# Patient Record
Sex: MALE | Race: WHITE | Employment: FULL TIME | URBAN - METROPOLITAN AREA
[De-identification: names, ages, dates, MRNs, and addresses within clinical notes are randomized per-mention and may not be internally consistent; named-entity substitution may affect disease eponyms.]

---

## 2022-10-27 ENCOUNTER — LABORATORY ENCOUNTER (OUTPATIENT)
Dept: LAB | Age: 67
End: 2022-10-27
Attending: ORTHOPAEDIC SURGERY
Payer: COMMERCIAL

## 2022-10-27 DIAGNOSIS — Z01.818 PRE-OP TESTING: ICD-10-CM

## 2022-10-27 PROCEDURE — 86850 RBC ANTIBODY SCREEN: CPT

## 2022-10-27 PROCEDURE — 86901 BLOOD TYPING SEROLOGIC RH(D): CPT

## 2022-10-27 PROCEDURE — 87081 CULTURE SCREEN ONLY: CPT

## 2022-10-27 PROCEDURE — 86900 BLOOD TYPING SEROLOGIC ABO: CPT

## 2022-10-27 PROCEDURE — 36415 COLL VENOUS BLD VENIPUNCTURE: CPT

## 2022-10-28 LAB
ANTIBODY SCREEN: NEGATIVE
RH BLOOD TYPE: POSITIVE

## 2022-11-30 ENCOUNTER — LABORATORY ENCOUNTER (OUTPATIENT)
Dept: LAB | Age: 67
End: 2022-11-30
Attending: ORTHOPAEDIC SURGERY
Payer: COMMERCIAL

## 2022-11-30 DIAGNOSIS — Z01.818 PRE-OP TESTING: ICD-10-CM

## 2022-11-30 LAB
APTT PPP: 37.4 SECONDS (ref 23.3–35.6)
INR BLD: 0.98 (ref 0.85–1.16)
PROTHROMBIN TIME: 12.9 SECONDS (ref 11.6–14.8)

## 2022-11-30 PROCEDURE — 85730 THROMBOPLASTIN TIME PARTIAL: CPT

## 2022-11-30 PROCEDURE — 85610 PROTHROMBIN TIME: CPT

## 2022-11-30 PROCEDURE — 36415 COLL VENOUS BLD VENIPUNCTURE: CPT

## 2022-11-30 PROCEDURE — 87081 CULTURE SCREEN ONLY: CPT

## 2024-08-29 RX ORDER — ACETAMINOPHEN 500 MG
1000 TABLET ORAL EVERY 6 HOURS
COMMUNITY
Start: 2022-11-04

## 2024-09-12 ENCOUNTER — EKG ENCOUNTER (OUTPATIENT)
Dept: LAB | Age: 69
End: 2024-09-12
Attending: ORTHOPAEDIC SURGERY
Payer: COMMERCIAL

## 2024-09-12 ENCOUNTER — LAB ENCOUNTER (OUTPATIENT)
Dept: LAB | Age: 69
End: 2024-09-12
Attending: ORTHOPAEDIC SURGERY
Payer: COMMERCIAL

## 2024-09-12 DIAGNOSIS — Z01.818 PRE-OP TESTING: ICD-10-CM

## 2024-09-12 LAB
ALBUMIN SERPL-MCNC: 4.4 G/DL (ref 3.2–4.8)
ALBUMIN/GLOB SERPL: 1.5 {RATIO} (ref 1–2)
ALP LIVER SERPL-CCNC: 85 U/L
ALT SERPL-CCNC: 18 U/L
ANION GAP SERPL CALC-SCNC: 7 MMOL/L (ref 0–18)
ANTIBODY SCREEN: NEGATIVE
AST SERPL-CCNC: 21 U/L (ref ?–34)
BASOPHILS # BLD AUTO: 0.07 X10(3) UL (ref 0–0.2)
BASOPHILS NFR BLD AUTO: 0.7 %
BILIRUB SERPL-MCNC: 0.5 MG/DL (ref 0.2–1.1)
BUN BLD-MCNC: 15 MG/DL (ref 9–23)
BUN/CREAT SERPL: 21.7 (ref 10–20)
CALCIUM BLD-MCNC: 9.5 MG/DL (ref 8.7–10.4)
CHLORIDE SERPL-SCNC: 101 MMOL/L (ref 98–112)
CO2 SERPL-SCNC: 25 MMOL/L (ref 21–32)
CREAT BLD-MCNC: 0.69 MG/DL
DEPRECATED RDW RBC AUTO: 44.4 FL (ref 35.1–46.3)
EGFRCR SERPLBLD CKD-EPI 2021: 99 ML/MIN/1.73M2 (ref 60–?)
EOSINOPHIL # BLD AUTO: 0.18 X10(3) UL (ref 0–0.7)
EOSINOPHIL NFR BLD AUTO: 1.9 %
ERYTHROCYTE [DISTWIDTH] IN BLOOD BY AUTOMATED COUNT: 11.9 % (ref 11–15)
FASTING STATUS PATIENT QL REPORTED: YES
GLOBULIN PLAS-MCNC: 3 G/DL (ref 2–3.5)
GLUCOSE BLD-MCNC: 97 MG/DL (ref 70–99)
HCT VFR BLD AUTO: 36.7 %
HGB BLD-MCNC: 13.1 G/DL
IMM GRANULOCYTES # BLD AUTO: 0.03 X10(3) UL (ref 0–1)
IMM GRANULOCYTES NFR BLD: 0.3 %
LYMPHOCYTES # BLD AUTO: 2.3 X10(3) UL (ref 1–4)
LYMPHOCYTES NFR BLD AUTO: 24.3 %
MCH RBC QN AUTO: 35.9 PG (ref 26–34)
MCHC RBC AUTO-ENTMCNC: 35.7 G/DL (ref 31–37)
MCV RBC AUTO: 100.5 FL
MONOCYTES # BLD AUTO: 0.59 X10(3) UL (ref 0.1–1)
MONOCYTES NFR BLD AUTO: 6.2 %
NEUTROPHILS # BLD AUTO: 6.3 X10 (3) UL (ref 1.5–7.7)
NEUTROPHILS # BLD AUTO: 6.3 X10(3) UL (ref 1.5–7.7)
NEUTROPHILS NFR BLD AUTO: 66.6 %
OSMOLALITY SERPL CALC.SUM OF ELEC: 277 MOSM/KG (ref 275–295)
PLATELET # BLD AUTO: 256 10(3)UL (ref 150–450)
POTASSIUM SERPL-SCNC: 4.4 MMOL/L (ref 3.5–5.1)
PROT SERPL-MCNC: 7.4 G/DL (ref 5.7–8.2)
RBC # BLD AUTO: 3.65 X10(6)UL
RH BLOOD TYPE: POSITIVE
SODIUM SERPL-SCNC: 133 MMOL/L (ref 136–145)
WBC # BLD AUTO: 9.5 X10(3) UL (ref 4–11)

## 2024-09-12 PROCEDURE — 87081 CULTURE SCREEN ONLY: CPT

## 2024-09-12 PROCEDURE — 86900 BLOOD TYPING SEROLOGIC ABO: CPT

## 2024-09-12 PROCEDURE — 86850 RBC ANTIBODY SCREEN: CPT

## 2024-09-12 PROCEDURE — 93005 ELECTROCARDIOGRAM TRACING: CPT

## 2024-09-12 PROCEDURE — 86901 BLOOD TYPING SEROLOGIC RH(D): CPT

## 2024-09-12 PROCEDURE — 36415 COLL VENOUS BLD VENIPUNCTURE: CPT

## 2024-09-12 PROCEDURE — 80053 COMPREHEN METABOLIC PANEL: CPT

## 2024-09-12 PROCEDURE — 93010 ELECTROCARDIOGRAM REPORT: CPT | Performed by: INTERNAL MEDICINE

## 2024-09-12 PROCEDURE — 85025 COMPLETE CBC W/AUTO DIFF WBC: CPT

## 2024-09-13 LAB
ATRIAL RATE: 79 BPM
P AXIS: 68 DEGREES
P-R INTERVAL: 202 MS
Q-T INTERVAL: 340 MS
QRS DURATION: 78 MS
QTC CALCULATION (BEZET): 389 MS
R AXIS: -25 DEGREES
T AXIS: 20 DEGREES
VENTRICULAR RATE: 79 BPM

## 2024-09-21 NOTE — H&P
Aneesh Tian  9/20/2024 1:30 PM   Office Visit  MRN: [id number]  Description: 69 year old male Provider: Chaz Ng APRN Department: [facility] INTERNAL MEDICINE     Scanning Cover Sheet    Click to print Barcode Encounter Cover Sheet for scanning  Office Visit  9/20/2024  Internal Medicine - [address], [place], [place], APRN  Nurse Practitioner Preoperative clearance +2 more  Dx Pre-Op Exam  Reason for Visit     Reason for Visit    Pre-Op Exam      Progress Notes  Chaz Ng APRN (Nurse Practitioner)  Nurse Practitioner  Expand All Collapse All     Subjective:  Patient ID: Aneesh Tian is a 69 year old male presenting for preoperative evaluation prior to preoperative clearance prior to left hip replacement surgery with Dr. Ellison at [facility] on 9/22/2024.     HPI: Patient has a longstanding history of hip osteoarthritis.  He is currently ambulating on crutches full-time.  Is able to bear weight with altered gait.  Given patient's reduced functional status, determined to be a good surgical candidate for left hip replacement.     Currently on no prescribed medications and only takes Tylenol as needed for pain.  Has no other reported health history     Overall, denies pain, headache, dizziness, lightheadedness, fevers, hearing/vision changes, chest pain, dyspnea, palpitations, N/V/D, weakness, parasthesias.       Patient states he is in good overall health and is ready for upcoming procedure.      Procedure will be under general sedation.  Has no previously documented surgical history. No history of bleeding complications.  No allergies to medications     Able to carry out ADLs without exertional chest pain.       Denies lightheadedness, dizziness, palpitations, chest pain and dyspnea with exertion and at rest.     Per medical clearance request, EKG, labwork, and MRSA swab indicated.              History/Other:  HISTORY:       Past Medical History:   Diagnosis Date    Osteoarthritis, hip,  bilateral        No past surgical history on file.         Family History   Problem Relation Age of Onset    Diabetes Father        Social History    Tobacco Use      Smoking status: Former        Types: Cigarettes      Smokeless tobacco: Never      Tobacco comments: Last 2000    Vaping Use      Vaping status: Never Used    Alcohol use: Not Currently    Drug use: Never            Review of Systems   Constitutional: Negative.    HENT: Negative.     Eyes: Negative.    Respiratory: Negative.     Cardiovascular: Negative.    Gastrointestinal: Negative.    Endocrine: Negative.    Genitourinary: Negative.    Musculoskeletal:  Positive for arthralgias and gait problem.   Skin: Negative.    Allergic/Immunologic: Negative.    Neurological:  Negative for dizziness, tremors, seizures, syncope, facial asymmetry, speech difficulty, weakness, light-headedness, numbness and headaches.   Hematological: Negative.    Psychiatric/Behavioral: Negative.               Current Outpatient Medications   Medication Sig Dispense Refill    acetaminophen 500 MG Oral Tab Take 2 tablets (1,000 mg total) by mouth 4 (four) times daily. 120 tablet 0    acetaminophen 500 MG Oral Tab Take 2 tablets (1,000 mg total) by mouth every 6 (six) hours. 120 tablet 0    aspirin 81 MG Oral Tab EC Take 1 tablet (81 mg total) by mouth 2 (two) times a day. (Patient not taking: Reported on 9/20/2024) 84 tablet 0    celecoxib (CELEBREX) 200 MG Oral Cap Take 1 capsule (200 mg total) by mouth daily. (Patient not taking: Reported on 9/20/2024) 30 capsule 0    docusate sodium (COLACE) 100 MG Oral Cap Take 1 capsule (100 mg total) by mouth 2 (two) times daily. (Patient not taking: Reported on 9/20/2024) 60 capsule 1    oxyCODONE 5 MG Oral Tab Take 1 tablet (5 mg total) by mouth every 6 (six) hours as needed for Pain (severe pain). (Patient not taking: Reported on 9/20/2024) 20 tablet 0    traMADol 50 MG Oral Tab Take 1 tablet (50 mg total) by mouth every 6 (six) hours as  needed for Pain (moderate pain). (Patient not taking: Reported on 9/20/2024) 40 tablet 0      Allergies:  Allergies   No Known Allergies              Objective:[]Expand by Default    09/20/24  1335 09/20/24  1410   BP: 122/66     Pulse: 100 84   Temp: 98.6 °F (37 °C)     TempSrc: Oral     SpO2: 98%     Weight: 161 lb (73 kg)     Height: 5' 10\" (1.778 m)              Body mass index is 23.1 kg/m².     Physical Exam  Vitals reviewed.   Constitutional:       General: He is not in acute distress.     Appearance: Normal appearance. He is normal weight. He is not ill-appearing or toxic-appearing.   HENT:      Head: Normocephalic and atraumatic.      Right Ear: Tympanic membrane, ear canal and external ear normal.      Left Ear: Tympanic membrane, ear canal and external ear normal.      Nose: Congestion present. No rhinorrhea.      Mouth/Throat:      Mouth: Mucous membranes are moist.      Pharynx: Oropharynx is clear. No oropharyngeal exudate or posterior oropharyngeal erythema.   Eyes:      Extraocular Movements: Extraocular movements intact.      Conjunctiva/sclera: Conjunctivae normal.      Pupils: Pupils are equal, round, and reactive to light.   Cardiovascular:      Rate and Rhythm: Normal rate and regular rhythm.      Pulses: Normal pulses.      Heart sounds: Murmur heard.   Pulmonary:      Effort: Pulmonary effort is normal. No respiratory distress.      Breath sounds: Normal breath sounds. No wheezing or rhonchi.   Chest:      Chest wall: No tenderness.   Abdominal:      General: Bowel sounds are normal.      Palpations: Abdomen is soft.      Tenderness: There is no abdominal tenderness.   Musculoskeletal:         General: Tenderness present. Normal range of motion.      Cervical back: Normal range of motion and neck supple. No rigidity or tenderness.      Right lower leg: No edema.      Left lower leg: No edema.   Lymphadenopathy:      Cervical: No cervical adenopathy.   Skin:     General: Skin is warm and dry.       Capillary Refill: Capillary refill takes less than 2 seconds.      Findings: No erythema or rash.   Neurological:      General: No focal deficit present.      Mental Status: He is alert and oriented to person, place, and time. Mental status is at baseline.   Psychiatric:         Mood and Affect: Mood normal.         Behavior: Behavior normal.         Thought Content: Thought content normal.         Judgment: Judgment normal.                 [facility]  Outside Information      Contains abnormal data Comp Metabolic Panel (14)  Specimen: Blood         Component  Ref Range & Units 8 d ago   Glucose  70 - 99 mg/dL 97   Sodium  136 - 145 mmol/L 133 Low    Potassium  3.5 - 5.1 mmol/L 4.4   Chloride  98 - 112 mmol/L 101   CO2  21.0 - 32.0 mmol/L 25.0   Anion Gap  0 - 18 mmol/L 7   BUN  9 - 23 mg/dL 15   Creatinine  0.70 - 1.30 mg/dL 0.69 Low    BUN/CREA Ratio  10.0 - 20.0 21.7 High    Calcium, Total  8.7 - 10.4 mg/dL 9.5   Calculated Osmolality  275 - 295 mOsm/kg 277   eGFR-Cr  >=60 mL/min/1.73m2 99   ALT  10 - 49 U/L 18   AST  <34 U/L 21   Alkaline Phosphatase  45 - 117 U/L 85   Bilirubin, Total  0.2 - 1.1 mg/dL 0.5   Total Protein  5.7 - 8.2 g/dL 7.4   Albumin  3.2 - 4.8 g/dL 4.4   Globulin  2.0 - 3.5 g/dL 3.0   A/G Ratio  1.0 - 2.0 1.5   Patient Fasting for CMP? Yes   Resulting Agency [facility] ([facility])   Specimen Collected: 09/12/24 12:21 PM     Performed by: [facility] ([facility]) Last Resulted: 09/12/24  6:12 PM   Received From: [facility]  Result Received: 09/17/24 11:42 AM            [facility]  Outside Information      Contains abnormal data CBC W Differential W Platelet  Specimen: Blood         Component  Ref Range & Units 8 d ago   WBC  4.0 - 11.0 x10(3) uL 9.5   RBC  3.80 - 5.80 x10(6)uL 3.65 Low    HGB  13.0 - 17.5 g/dL 13.1   HCT  39.0 - 53.0 % 36.7 Low    MCV  80.0 - 100.0  fL 100.5 High    MCH  26.0 - 34.0 pg 35.9 High    MCHC  31.0 - 37.0 g/dL 35.7   RDW-SD  35.1 - 46.3 fL 44.4   RDW  11.0 - 15.0 % 11.9   PLT  150.0 - 450.0 10(3)uL 256.0   Neutrophil Absolute Prelim  1.50 - 7.70 x10 (3) uL 6.30   Neutrophil Absolute  1.50 - 7.70 x10(3) uL 6.30   Lymphocyte Absolute  1.00 - 4.00 x10(3) uL 2.30   Monocyte Absolute  0.10 - 1.00 x10(3) uL 0.59   Eosinophil Absolute  0.00 - 0.70 x10(3) uL 0.18   Basophil Absolute  0.00 - 0.20 x10(3) uL 0.07   Immature Granulocyte Absolute  0.00 - 1.00 x10(3) uL 0.03   Neutrophil %  % 66.6   Lymphocyte %  % 24.3   Monocyte %  % 6.2   Eosinophil %  % 1.9   Basophil %  % 0.7   Immature Granulocyte %  % 0.3   Resulting Agency [facility] ([facility])   Specimen Collected: 09/12/24 12:21 PM     Performed by: [facility] ([facility]) Last Resulted: 09/12/24  5:15 PM   Received From: [facility]  Result Received: 09/17/24 11:42 AM            [facility]  Outside Information     MSSA and MRSA Culture Screen  Specimen: Other - Specimen from internal nose (specimen)         Component 8 d ago   MSSA/MRSA Culture No MRSA or Staph aureus(MSSA) Isolated   Resulting Agency [facility] ([facility])   Specimen Collected: 09/12/24 12:21 PM     Performed by: [facility] ([facility]) Last Resulted: 09/13/24 11:47 AM   Received From: [facility]  Result Received: 09/17/24 11:42 AM       Recent Data from [facility]  Related to MSSA and MRSA Culture Screen  Component 09/12/24 11/30/22 10/27/22   MSSA/MRSA Culture No MRSA or Staph aureus(MSSA) Isolated No MRSA or Staph aureus(MSSA) Isolated No MRSA or Staph aureus(MSSA) Isolated                  Assessment & Plan:  Diagnoses and all orders for this visit:     Preoperative clearance  Hip arthritis  Bone disease  Physical exam stable.  Vital  signs stable.  No history of bleeding complications. No medication allergies.  CBC, CMP, EKG stable.  MRSA swab negative.     Medical clearance granted.  Acceptable surgical risk.  Patient medically optimized for surgery.  No further preoperative intervention required at this time.  I have discussed with patient and she wishes to proceed with planned procedure.       Patient may proceed with planned procedure: Left hip replacement surgery with Dr. Ellison at [facility] on 9/23/2024.     Note will be faxed and sent to [facility] preop.     Patient to follow-up with PCP post surgery.     Orders placed:     -     ELECTROCARDIOGRAM, COMPLETE           Meds This Visit:  Requested Prescriptions        No prescriptions requested or ordered in this encounter         Imaging & Referrals:  ELECTROCARDIOGRAM, COMPLETE     40 minutes total time spent in pre-visit work, reviewing history/test results, face to face time, education/counseling, placing orders, documentation, independent results interpretation, and coordination of care.                      Instructions    After Visit Summary (Printed 9/20/2024)  Additional Documentation    Vitals: /66 (BP Location: Left arm, Patient Position: Sitting, Cuff Size: adult)     Pulse 84     Temp 98.6 °F (37 °C) (Oral)     Ht 5' 10\" (1.778 m)     Wt 161 lb (73 kg)     SpO2 98%     BMI 23.10 kg/m²     BSA 1.9 m²          More Vitals   Flowsheets: DMG TEMP FOR BP BPA COMPARE   Encounter Info: Billing Info,     History,     Allergies,     Detailed Report     Communications    View All Conversations on this Encounter    Chart Routed to Mary Finley MD and Duy Ellison MD  Oncology History    Encounter Status    Electronically signed by Chaz Ng APRN on 9/20/24 at 5:54 PM  Encounter Reviewed By      ADDENDUM:    The above referenced H&P was reviewed by Duy Ellison MD on 9/27/2024, the patient was examined and no significant changes have occurred in the patient's  condition since the H&P was performed.  I discussed with the patient and/or legal representative the potential benefits, risks and side effects of this procedure; the likelihood of the patient achieving goals; and potential problems that might occur during recuperation.  I discussed reasonable alternatives to the procedure, including risks, benefits and side effects related to the alternatives and risks related to not receiving this procedure.  We will proceed with procedure as planned.

## 2024-09-22 NOTE — DISCHARGE INSTRUCTIONS
[facility]  Phone # [phone]  Fax # [phone]    Sometimes managing your health at home requires assistance.  The [facility] team has recognized your preference to use [facility]: [phone].  A representative from the home health agency will contact you or your family to schedule your first visit.        Please verify or make your first post operative appointment by calling your surgeon's office.  Appointment should be within next 10-21 days.     Call your surgeon's office if:  You have fever over 101.  Wound looks very red.  You have calf pain.    If you have chest pain or shortness of breath, call 911.    Move slowly and have your caregiver next to you for the first few days as you sit up from supine or stand from sitting.  You may feel lightheaded and need support.  Drink plenty of fluids to keep yourself hydrated.  You may put as much weight on operative leg as you feel comfortable.  You may wean off assistive walking device as you feel comfortable doing so under your therapist's supervision.    Wear your compressive sleeve during day time until follow up appointment.  Elevate and ice to control swelling.  Take 10 deep breaths every hour you are awake to keep your lungs expanded.  Perform active ankle pumps 10 times every hour you are awake.  You are encouraged to walk around your first floor living space every 1-2 hours while you are awake.  Start dressing change in one week from date of surgery.   Change dressing daily.    Please focus on range of motion of your knee.   Please achieve at least 0-90 degrees motion by your first postoperative appointment.    Medications:  Aspirin 81 mg:  take one tab twice daily to prevent blood clot.    Colace is a stool softener.  Please take twice daily.  Extra Strength Tylenol.  Take 2 tabs (1000mg) 4 times daily.  (maximum 4000mg daily)  Celebrex is a nonsteroidal anti-inflammatory medication. Please take one tab  daily.  Tramadol is a pain medication for moderate pain.  Use as needed per instruction.  Oxycodone is a pain medication for severe pain.  Use as needed per instruction  Do not take Tramadol and Oxydone at the same time.  Space out at least one hour.      Hip Replacement Discharge Instructions    Dear Patient,     [facility] cares about your progress with recovery following your joint replacement surgery.     300 days from your scheduled surgery, [facility] will send you a follow-up survey to help us understand how your surgery impacted your mobility, pain, and overall quality of life. Please make every effort to complete this survey. The information collected from this survey will be used by your physician to track your recovery.     Sincerely,     [facility]    Activity    Bathing  No tub baths, pools, or saunas until cleared by surgeon (about 4-6 weeks because it takes that long for the incision on the skin to heal and be a barrier to prevent infection.)  When allowed to shower:      AQUACEL dressing is waterproof and does not require being covered before showering.  Pat dressing and surrounding skin dry after shower                      AQUACEL        MEDIPORE/COVERLET dressing is NOT waterproof and REQUIRES being covered with a waterproof barrier to keep the dressing and incision dry.  SARAN WRAP, GLAD WRAP, PRESS N SEAL WORK REALLY WELL BUT ANY PLASTIC WRAP WILL DO.  Do not wash incision.   Remove entire wrapping and old dressing (if Medipore/coverlet) after showering. Pat dry with a CLEAN TOWEL if necessary and cover incision with new Medipore/coverlet. For other types of dressings, follow surgeon’s orders.                  MEDIPORE/COVERLET            Driving  Do not drive until cleared by surgeon. This is usually four to six weeks after surgery. Discuss this at follow-up office visit.   Not allowed while taking narcotic pain medication or muscle  relaxants.    Sex  Usually allowed after four to six weeks - check with surgeon at your office visit.  Return to work  Usually allowed after four to six weeks. Discuss specific work activities with your surgeon.    Restrictions  For hip replacement surgery, follow instructions provided by physical therapy    No smoking  Avoid smoking. It is known to cause breathing problems and can decrease the rate of healing.    Incision care/Dressing changes  Wash hands before and after dressing changes.    FOR MEDIPORE/COVERLET DRESSINGS:  Change dressing daily using Medipore/coverlet once Aquacel (waterproof) dressing is removed (which is about 7 days after surgery). Patient should be standing or lying flat so dressing goes on smoothly.  (This dressing needed for hip patients because of location of incision-don’t want contamination from bathroom use)   Continue this until your first visit with your surgeon’s office.  There could be a small amount of redness around the staples or incision; this is normal.  Watch for increased redness, warmth, any odor, increased drainage or opening of the incision. A little clear yellow or blood tinged drainage is normal up to 2 weeks after surgery but it should be less every day until it stops.  Call physician if you notice any concerning changes.  Sutures/staples will be removed at first office visit (10 days- 3 weeks).                         MEDIPORE /COVERLET          Medication  Anticoagulants = blood thinners (Xarelto, Eliquis, Lovenox, Coumadin or Aspirin)  Pill or shot form depending on what your physician orders.   IF placed on Coumadin, you may also need lab work done for monitoring.  You will bleed easier and bruise easier while on these medications.     Usually you will be on a blood thinner for about 4-5 weeks.  Contact your physician if you have signs of bruising, nose bleeds or blood in your urine. Use electric razors and soft toothbrushes only.  Do not take aspirin while taking  blood thinners unless ordered by your physician.  Review anticoagulant education information sheet provided.    Discomfort  Surgical discomfort is normal for one to two months.  Have realistic goals and keep a positive outlook.  Keep pain manageable; pain should not disrupt your sleep or activities like getting out of bed or walking.  You may need pain medication regularly (every 4-6 hours) the first 2 weeks and then begin to decrease how often you are taking it.  Take pain medication as prescribed with food, especially before therapy, allowing 30-60 minutes to take effect.  Do not drink alcohol while on pain medication.  As you have less discomfort, decrease the amount of pain medication you take. Use plain Tylenol (acetaminophen) for less severe pain.  Some pain medications have Tylenol (acetaminophen) in them such as Norco and Percocet. Do NOT take Tylenol (acetaminophen) within 4 hours of a dose of these medications.  Apply ice  or cold therapy to surgical site for 20 minutes at least four times a day, especially after therapy. Be sure there is a thin cloth barrier between skin and ice or cold therapy.  Change position at least every 45 minutes while awake to avoid stiffness or increased discomfort.  Deep breathing and relaxation techniques and distractions can help!  If you focus on something else, you do not experience the pain the same. Take advantage of everything available to your to help control you discomfort.  Contact physician if discomfort does not respond to pain medication.    Body changes  Constipation is common with the use of narcotics.  Eat fiber rich foods and drink plenty of fluids.  Use stool softeners such as Colace or Senakot while on narcotics, and laxatives such as Miralax or Milk of Magnesia if needed.   An enema or suppository may be needed if above measures do not work.    Prevention of infection and promotion of healing  Good hand washing is important. Everyone should wash their hands  or use hand  as soon as they walk in your house-whether they live there or are visiting.  Keep bed linen/clothing freshly laundered.  Do not allow others or pets to touch your incision.  Avoid people that have colds or the flu.  Your surgeon may recommend that you take antibiotics before you undergo any dental or other invasive surgical procedures after your joint replacement. Speak with your physician about this at your post-op office visit.  Eat a balanced diet high in fiber and drink plenty of fluids.   Continue using incentive spirometry because narcotics make you sleepy so you may not take good deep breaths. We do not want you to get pneumonia.     Post op Office visits  Schedule 10 days to 3 weeks after surgery WITH SURGEON’s office.  Additional visits may need to be scheduled. Your physician will discuss this at first post-op office visit.  Schedule outpatient physical therapy per your surgeon’s orders.  Schedule one week follow up after surgery WITH PRIMARY CARE PHYSICIAN; review your medications over last 6 months.  Your body gets stressed by surgery and that stress can affect all your other health issues (such as high blood pressure, diabetes, CHF, afib, and asthma just to name a few).  We don’t want those other health issues to cause you to get readmitted to the hospital; much better for you to catch developing problems and prevent them from becoming larger ones.  IRVING HOSE - IF ordered by your surgeon, wear these during the day and off at night.  Surgeon will tell you when you don’t need them anymore.    Notify your surgeon if you notice any of the following signs  Separation of incision line.  Increased redness, swelling, or warmth of skin around incision.  Increased or foul smelling drainage from incision  Red streaks on skin near incision.  Temperature >100.4F.  Increased pain at incision not relieved by pain medication.    Signs of Possible Dislocation  Increased severe leg or groin  pain  Turning in or out of surgical leg that is new  Increased numbness, tingling to leg  Inability to walk or put weight on your surgical leg        Signs of blood clot  Pain, excessive tenderness, redness, or swelling in leg or calf (other than incision site).    Go directly to the ER or CALL 911 if  you:  become short of breath  have chest pain  cough up blood  have unexplained anxiety with breathing   Traveling and Handicapped parking  Check with you surgeon when you are allowed to travel so you don’t set yourself up for greater chance of complications.  If traveling by car, get out to stretch every 2 hours.  This helps prevent stiffness.  You may need to do this any time you travel for the first year after surgery.  If traveling by plane, BEFORE you get into a security line, let them know that you had your hip replaced, as you will most likely set off the metal detector. The doctors no longer provide an identification card for this as they are easily copied. ALSO request a wheelchair the first year to board and get off a plane…this aids in priority seating and you should sit on the aisle or at the bulkhead where you can easily stretch your legs and get up to walk up and down the aisles…this helps prevent blood clots and stiffness.  TEMPORARY HANDICAP PARKING APPLICATION  (good for 3-6 months)  - At Surgeon or PCP visit, request they fill out the form, then go to DM (only time you do not wait in a long line there). Some [facility] offices provide the same service. ([facility] and [place] have this service; if you live in another [facility], you may check with them as well). You need space to open car doors to position yourself properly with walker to get in and out of your car safely; some parking spaces are  practically on top of each other and do not give you enough room.     SPECIAL  INSTRUCTIONS:

## 2024-09-23 ENCOUNTER — ANESTHESIA EVENT (OUTPATIENT)
Dept: SURGERY | Facility: HOSPITAL | Age: 69
End: 2024-09-23
Payer: COMMERCIAL

## 2024-09-23 ENCOUNTER — ANESTHESIA (OUTPATIENT)
Dept: SURGERY | Facility: HOSPITAL | Age: 69
End: 2024-09-23
Payer: COMMERCIAL

## 2024-09-27 ENCOUNTER — APPOINTMENT (OUTPATIENT)
Dept: GENERAL RADIOLOGY | Facility: HOSPITAL | Age: 69
End: 2024-09-27
Attending: ORTHOPAEDIC SURGERY
Payer: COMMERCIAL

## 2024-09-27 ENCOUNTER — HOSPITAL ENCOUNTER (OUTPATIENT)
Facility: HOSPITAL | Age: 69
Discharge: HOME | End: 2024-09-28
Attending: ORTHOPAEDIC SURGERY | Admitting: ORTHOPAEDIC SURGERY
Payer: COMMERCIAL

## 2024-09-27 DIAGNOSIS — Z01.818 PRE-OP TESTING: Primary | ICD-10-CM

## 2024-09-27 PROCEDURE — 88311 DECALCIFY TISSUE: CPT | Performed by: ORTHOPAEDIC SURGERY

## 2024-09-27 PROCEDURE — 0SRB039 REPLACEMENT OF LEFT HIP JOINT WITH CERAMIC SYNTHETIC SUBSTITUTE, CEMENTED, OPEN APPROACH: ICD-10-PCS | Performed by: ORTHOPAEDIC SURGERY

## 2024-09-27 PROCEDURE — 88304 TISSUE EXAM BY PATHOLOGIST: CPT | Performed by: ORTHOPAEDIC SURGERY

## 2024-09-27 PROCEDURE — 76000 FLUOROSCOPY <1 HR PHYS/QHP: CPT | Performed by: ORTHOPAEDIC SURGERY

## 2024-09-27 DEVICE — BIOLOX DELTA CERAMIC FEMORAL HEAD +5.0 36MM DIA 12/14 TAPER
Type: IMPLANTABLE DEVICE | Site: HIP | Status: FUNCTIONAL
Brand: BIOLOX DELTA

## 2024-09-27 DEVICE — C-STEM VOID CENTRALISER 14MM
Type: IMPLANTABLE DEVICE | Site: HIP | Status: FUNCTIONAL
Brand: C-STEM

## 2024-09-27 DEVICE — PINNACLE HIP SOLUTIONS ALTRX POLYETHYLENE ACETABULAR LINER NEUTRAL 36MM ID 58MM OD
Type: IMPLANTABLE DEVICE | Site: HIP | Status: FUNCTIONAL
Brand: PINNACLE ALTRX

## 2024-09-27 DEVICE — PINNACLE GRIPTION ACETABULAR SHELL MULTI-HOLE 58MM OD
Type: IMPLANTABLE DEVICE | Site: HIP | Status: FUNCTIONAL
Brand: PINNACLE GRIPTION

## 2024-09-27 DEVICE — C-STEM AMT CEMENTED STEM HIGH OFFSET SIZE 7 12/14 TAPER
Type: IMPLANTABLE DEVICE | Site: HIP | Status: FUNCTIONAL
Brand: C-STEM

## 2024-09-27 RX ORDER — ASPIRIN 81 MG/1
81 TABLET ORAL 2 TIMES DAILY
Status: DISCONTINUED | OUTPATIENT
Start: 2024-09-27 | End: 2024-09-28

## 2024-09-27 RX ORDER — ACETAMINOPHEN 325 MG/1
TABLET ORAL
Status: COMPLETED
Start: 2024-09-27 | End: 2024-09-27

## 2024-09-27 RX ORDER — METOCLOPRAMIDE HYDROCHLORIDE 5 MG/ML
10 INJECTION INTRAMUSCULAR; INTRAVENOUS EVERY 8 HOURS PRN
Status: DISCONTINUED | OUTPATIENT
Start: 2024-09-27 | End: 2024-09-28

## 2024-09-27 RX ORDER — KETOROLAC TROMETHAMINE 15 MG/ML
15 INJECTION, SOLUTION INTRAMUSCULAR; INTRAVENOUS EVERY 6 HOURS
Status: DISCONTINUED | OUTPATIENT
Start: 2024-09-27 | End: 2024-09-28

## 2024-09-27 RX ORDER — SODIUM CHLORIDE, SODIUM LACTATE, POTASSIUM CHLORIDE, CALCIUM CHLORIDE 600; 310; 30; 20 MG/100ML; MG/100ML; MG/100ML; MG/100ML
INJECTION, SOLUTION INTRAVENOUS CONTINUOUS
Status: DISCONTINUED | OUTPATIENT
Start: 2024-09-27 | End: 2024-09-28

## 2024-09-27 RX ORDER — DOCUSATE SODIUM 100 MG/1
100 CAPSULE, LIQUID FILLED ORAL 2 TIMES DAILY
Status: DISCONTINUED | OUTPATIENT
Start: 2024-09-27 | End: 2024-09-28

## 2024-09-27 RX ORDER — KETAMINE HYDROCHLORIDE 50 MG/ML
INJECTION, SOLUTION INTRAMUSCULAR; INTRAVENOUS AS NEEDED
Status: DISCONTINUED | OUTPATIENT
Start: 2024-09-27 | End: 2024-09-27 | Stop reason: SURG

## 2024-09-27 RX ORDER — SENNOSIDES 8.6 MG
17.2 TABLET ORAL NIGHTLY
Status: DISCONTINUED | OUTPATIENT
Start: 2024-09-27 | End: 2024-09-28

## 2024-09-27 RX ORDER — ONDANSETRON 2 MG/ML
4 INJECTION INTRAMUSCULAR; INTRAVENOUS EVERY 6 HOURS PRN
Status: DISCONTINUED | OUTPATIENT
Start: 2024-09-27 | End: 2024-09-27 | Stop reason: HOSPADM

## 2024-09-27 RX ORDER — GLYCOPYRROLATE 0.2 MG/ML
INJECTION, SOLUTION INTRAMUSCULAR; INTRAVENOUS AS NEEDED
Status: DISCONTINUED | OUTPATIENT
Start: 2024-09-27 | End: 2024-09-27 | Stop reason: SURG

## 2024-09-27 RX ORDER — ACETAMINOPHEN 500 MG
1000 TABLET ORAL ONCE
Status: DISCONTINUED | OUTPATIENT
Start: 2024-09-27 | End: 2024-09-27 | Stop reason: HOSPADM

## 2024-09-27 RX ORDER — HYDROMORPHONE HYDROCHLORIDE 1 MG/ML
INJECTION, SOLUTION INTRAMUSCULAR; INTRAVENOUS; SUBCUTANEOUS
Status: COMPLETED
Start: 2024-09-27 | End: 2024-09-27

## 2024-09-27 RX ORDER — METOCLOPRAMIDE HYDROCHLORIDE 5 MG/ML
10 INJECTION INTRAMUSCULAR; INTRAVENOUS EVERY 8 HOURS PRN
Status: DISCONTINUED | OUTPATIENT
Start: 2024-09-27 | End: 2024-09-27 | Stop reason: HOSPADM

## 2024-09-27 RX ORDER — HYDROMORPHONE HYDROCHLORIDE 1 MG/ML
0.6 INJECTION, SOLUTION INTRAMUSCULAR; INTRAVENOUS; SUBCUTANEOUS EVERY 5 MIN PRN
Status: DISCONTINUED | OUTPATIENT
Start: 2024-09-27 | End: 2024-09-27 | Stop reason: HOSPADM

## 2024-09-27 RX ORDER — DIPHENHYDRAMINE HYDROCHLORIDE 50 MG/ML
25 INJECTION INTRAMUSCULAR; INTRAVENOUS ONCE AS NEEDED
Status: ACTIVE | OUTPATIENT
Start: 2024-09-27 | End: 2024-09-27

## 2024-09-27 RX ORDER — PHENYLEPHRINE HCL 10 MG/ML
VIAL (ML) INJECTION AS NEEDED
Status: DISCONTINUED | OUTPATIENT
Start: 2024-09-27 | End: 2024-09-27 | Stop reason: SURG

## 2024-09-27 RX ORDER — HYDROMORPHONE HYDROCHLORIDE 1 MG/ML
0.4 INJECTION, SOLUTION INTRAMUSCULAR; INTRAVENOUS; SUBCUTANEOUS EVERY 5 MIN PRN
Status: DISCONTINUED | OUTPATIENT
Start: 2024-09-27 | End: 2024-09-27 | Stop reason: HOSPADM

## 2024-09-27 RX ORDER — PSEUDOEPHEDRINE HCL 30 MG
100 TABLET ORAL 2 TIMES DAILY
COMMUNITY
Start: 2024-09-17

## 2024-09-27 RX ORDER — ACETAMINOPHEN 325 MG/1
650 TABLET ORAL ONCE
Status: COMPLETED | OUTPATIENT
Start: 2024-09-27 | End: 2024-09-27

## 2024-09-27 RX ORDER — CELECOXIB 200 MG/1
200 CAPSULE ORAL DAILY
COMMUNITY
Start: 2024-09-17

## 2024-09-27 RX ORDER — DEXAMETHASONE SODIUM PHOSPHATE 10 MG/ML
8 INJECTION, SOLUTION INTRAMUSCULAR; INTRAVENOUS ONCE
Status: COMPLETED | OUTPATIENT
Start: 2024-09-28 | End: 2024-09-27

## 2024-09-27 RX ORDER — HYDROMORPHONE HYDROCHLORIDE 1 MG/ML
0.8 INJECTION, SOLUTION INTRAMUSCULAR; INTRAVENOUS; SUBCUTANEOUS EVERY 2 HOUR PRN
Status: DISCONTINUED | OUTPATIENT
Start: 2024-09-27 | End: 2024-09-28

## 2024-09-27 RX ORDER — SODIUM PHOSPHATE, DIBASIC AND SODIUM PHOSPHATE, MONOBASIC 7; 19 G/230ML; G/230ML
1 ENEMA RECTAL ONCE AS NEEDED
Status: DISCONTINUED | OUTPATIENT
Start: 2024-09-27 | End: 2024-09-28

## 2024-09-27 RX ORDER — DEXAMETHASONE SODIUM PHOSPHATE 4 MG/ML
VIAL (ML) INJECTION AS NEEDED
Status: DISCONTINUED | OUTPATIENT
Start: 2024-09-27 | End: 2024-09-27 | Stop reason: SURG

## 2024-09-27 RX ORDER — FERROUS SULFATE 325(65) MG
325 TABLET, DELAYED RELEASE (ENTERIC COATED) ORAL
Status: DISCONTINUED | OUTPATIENT
Start: 2024-09-28 | End: 2024-09-28

## 2024-09-27 RX ORDER — FAMOTIDINE 20 MG/1
20 TABLET, FILM COATED ORAL 2 TIMES DAILY
Status: DISCONTINUED | OUTPATIENT
Start: 2024-09-27 | End: 2024-09-28

## 2024-09-27 RX ORDER — ONDANSETRON 2 MG/ML
4 INJECTION INTRAMUSCULAR; INTRAVENOUS EVERY 6 HOURS PRN
Status: DISCONTINUED | OUTPATIENT
Start: 2024-09-27 | End: 2024-09-28

## 2024-09-27 RX ORDER — TRAMADOL HYDROCHLORIDE 50 MG/1
50 TABLET ORAL EVERY 6 HOURS SCHEDULED
Status: DISCONTINUED | OUTPATIENT
Start: 2024-09-27 | End: 2024-09-28

## 2024-09-27 RX ORDER — OXYCODONE HYDROCHLORIDE 5 MG/1
5 TABLET ORAL EVERY 4 HOURS PRN
Status: DISCONTINUED | OUTPATIENT
Start: 2024-09-27 | End: 2024-09-28

## 2024-09-27 RX ORDER — ASPIRIN 81 MG/1
81 TABLET ORAL 2 TIMES DAILY
COMMUNITY
Start: 2024-09-17

## 2024-09-27 RX ORDER — FAMOTIDINE 10 MG/ML
20 INJECTION, SOLUTION INTRAVENOUS 2 TIMES DAILY
Status: DISCONTINUED | OUTPATIENT
Start: 2024-09-27 | End: 2024-09-28

## 2024-09-27 RX ORDER — NALOXONE HYDROCHLORIDE 0.4 MG/ML
0.08 INJECTION, SOLUTION INTRAMUSCULAR; INTRAVENOUS; SUBCUTANEOUS AS NEEDED
Status: DISCONTINUED | OUTPATIENT
Start: 2024-09-27 | End: 2024-09-27 | Stop reason: HOSPADM

## 2024-09-27 RX ORDER — SODIUM CHLORIDE, SODIUM LACTATE, POTASSIUM CHLORIDE, CALCIUM CHLORIDE 600; 310; 30; 20 MG/100ML; MG/100ML; MG/100ML; MG/100ML
INJECTION, SOLUTION INTRAVENOUS CONTINUOUS
Status: DISCONTINUED | OUTPATIENT
Start: 2024-09-27 | End: 2024-09-27 | Stop reason: HOSPADM

## 2024-09-27 RX ORDER — ACETAMINOPHEN 500 MG
1000 TABLET ORAL ONCE AS NEEDED
Status: DISCONTINUED | OUTPATIENT
Start: 2024-09-27 | End: 2024-09-27 | Stop reason: HOSPADM

## 2024-09-27 RX ORDER — OXYCODONE HYDROCHLORIDE 10 MG/1
10 TABLET ORAL EVERY 4 HOURS PRN
Status: DISCONTINUED | OUTPATIENT
Start: 2024-09-27 | End: 2024-09-28

## 2024-09-27 RX ORDER — HYDROMORPHONE HYDROCHLORIDE 1 MG/ML
0.4 INJECTION, SOLUTION INTRAMUSCULAR; INTRAVENOUS; SUBCUTANEOUS EVERY 2 HOUR PRN
Status: DISCONTINUED | OUTPATIENT
Start: 2024-09-27 | End: 2024-09-28

## 2024-09-27 RX ORDER — DIPHENHYDRAMINE HYDROCHLORIDE 50 MG/ML
12.5 INJECTION INTRAMUSCULAR; INTRAVENOUS EVERY 4 HOURS PRN
Status: DISCONTINUED | OUTPATIENT
Start: 2024-09-27 | End: 2024-09-28

## 2024-09-27 RX ORDER — TRANEXAMIC ACID 10 MG/ML
1000 INJECTION, SOLUTION INTRAVENOUS ONCE
Status: COMPLETED | OUTPATIENT
Start: 2024-09-27 | End: 2024-09-27

## 2024-09-27 RX ORDER — TRAMADOL HYDROCHLORIDE 50 MG/1
1 TABLET ORAL EVERY 6 HOURS PRN
COMMUNITY
Start: 2024-09-17

## 2024-09-27 RX ORDER — OXYCODONE HYDROCHLORIDE 5 MG/1
1 TABLET ORAL EVERY 6 HOURS PRN
COMMUNITY
Start: 2024-09-17

## 2024-09-27 RX ORDER — SODIUM CHLORIDE, SODIUM LACTATE, POTASSIUM CHLORIDE, CALCIUM CHLORIDE 600; 310; 30; 20 MG/100ML; MG/100ML; MG/100ML; MG/100ML
INJECTION, SOLUTION INTRAVENOUS CONTINUOUS
Status: DISCONTINUED | OUTPATIENT
Start: 2024-09-27 | End: 2024-09-27

## 2024-09-27 RX ORDER — ACETAMINOPHEN 500 MG
1000 TABLET ORAL EVERY 8 HOURS SCHEDULED
Status: DISCONTINUED | OUTPATIENT
Start: 2024-09-27 | End: 2024-09-28

## 2024-09-27 RX ORDER — HYDROMORPHONE HYDROCHLORIDE 1 MG/ML
0.2 INJECTION, SOLUTION INTRAMUSCULAR; INTRAVENOUS; SUBCUTANEOUS EVERY 5 MIN PRN
Status: DISCONTINUED | OUTPATIENT
Start: 2024-09-27 | End: 2024-09-27 | Stop reason: HOSPADM

## 2024-09-27 RX ORDER — BISACODYL 10 MG
10 SUPPOSITORY, RECTAL RECTAL
Status: DISCONTINUED | OUTPATIENT
Start: 2024-09-27 | End: 2024-09-28

## 2024-09-27 RX ORDER — MIDAZOLAM HYDROCHLORIDE 1 MG/ML
INJECTION INTRAMUSCULAR; INTRAVENOUS AS NEEDED
Status: DISCONTINUED | OUTPATIENT
Start: 2024-09-27 | End: 2024-09-27 | Stop reason: SURG

## 2024-09-27 RX ORDER — DIPHENHYDRAMINE HCL 25 MG
25 CAPSULE ORAL EVERY 4 HOURS PRN
Status: DISCONTINUED | OUTPATIENT
Start: 2024-09-27 | End: 2024-09-28

## 2024-09-27 RX ORDER — POLYETHYLENE GLYCOL 3350 17 G/17G
17 POWDER, FOR SOLUTION ORAL DAILY PRN
Status: DISCONTINUED | OUTPATIENT
Start: 2024-09-27 | End: 2024-09-28

## 2024-09-27 RX ORDER — ONDANSETRON 2 MG/ML
INJECTION INTRAMUSCULAR; INTRAVENOUS AS NEEDED
Status: DISCONTINUED | OUTPATIENT
Start: 2024-09-27 | End: 2024-09-27 | Stop reason: SURG

## 2024-09-27 RX ADMIN — MIDAZOLAM HYDROCHLORIDE 2 MG: 1 INJECTION INTRAMUSCULAR; INTRAVENOUS at 14:12:00

## 2024-09-27 RX ADMIN — KETAMINE HYDROCHLORIDE 25 MG: 50 INJECTION, SOLUTION INTRAMUSCULAR; INTRAVENOUS at 14:20:00

## 2024-09-27 RX ADMIN — TRANEXAMIC ACID 1000 MG: 10 INJECTION, SOLUTION INTRAVENOUS at 14:20:00

## 2024-09-27 RX ADMIN — DEXAMETHASONE SODIUM PHOSPHATE 8 MG: 4 MG/ML VIAL (ML) INJECTION at 14:24:00

## 2024-09-27 RX ADMIN — SODIUM CHLORIDE, SODIUM LACTATE, POTASSIUM CHLORIDE, CALCIUM CHLORIDE: 600; 310; 30; 20 INJECTION, SOLUTION INTRAVENOUS at 16:11:00

## 2024-09-27 RX ADMIN — SODIUM CHLORIDE, SODIUM LACTATE, POTASSIUM CHLORIDE, CALCIUM CHLORIDE: 600; 310; 30; 20 INJECTION, SOLUTION INTRAVENOUS at 14:12:00

## 2024-09-27 RX ADMIN — GLYCOPYRROLATE 0.2 MG: 0.2 INJECTION, SOLUTION INTRAMUSCULAR; INTRAVENOUS at 14:20:00

## 2024-09-27 RX ADMIN — ONDANSETRON 4 MG: 2 INJECTION INTRAMUSCULAR; INTRAVENOUS at 14:24:00

## 2024-09-27 RX ADMIN — SODIUM CHLORIDE, SODIUM LACTATE, POTASSIUM CHLORIDE, CALCIUM CHLORIDE: 600; 310; 30; 20 INJECTION, SOLUTION INTRAVENOUS at 15:07:00

## 2024-09-27 RX ADMIN — PHENYLEPHRINE HCL 200 MCG: 10 MG/ML VIAL (ML) INJECTION at 15:06:00

## 2024-09-27 NOTE — ANESTHESIA POSTPROCEDURE EVALUATION
[facility]    Aneesh Tian Patient Status:  Outpatient in a Bed   Age/Gender 69 year old male MRN [id number]   Location [facility] SURGERY Attending Duy Ellison MD   Hosp Day # 0 PCP Mary Finley MD       Anesthesia Post-op Note    LEFT ANTERIOR TOTAL HIP REPLACEMENT    Procedure Summary       Date: 09/27/24 Room / Location:  MAIN OR 14 /  MAIN OR    Anesthesia Start: 1412 Anesthesia Stop: 1629    Procedure: LEFT ANTERIOR TOTAL HIP REPLACEMENT (Left: Hip) Diagnosis: (LEFT HIP OSTEOARTHRITIS)    Surgeons: Duy Ellison MD Responsible Provider: Doc Cardoza MD    Anesthesia Type: general, spinal ASA Status: 1            Anesthesia Type: general, spinal    Vitals Value Taken Time   /77 09/27/24 1629   Temp 97.9 09/27/24 1629   Pulse 78 09/27/24 1629   Resp 16 09/27/24 1629   SpO2 98 09/27/24 1629       Patient Location: PACU    Anesthesia Type: spinal    Airway Patency: patent    Postop Pain Control: adequate    Mental Status: preanesthetic baseline    Nausea/Vomiting: none    Cardiopulmonary/Hydration status: stable euvolemic    Complications: no apparent anesthesia related complications    Postop vital signs: stable    Dental Exam: Unchanged from Preop

## 2024-09-27 NOTE — ANESTHESIA PREPROCEDURE EVALUATION
PRE-OP EVALUATION    Patient Name: Aneesh Tian    Admit Diagnosis: LEFT HIP OSTEOARTHRITIS    Pre-op Diagnosis: LEFT HIP OSTEOARTHRITIS    LEFT ANTERIOR TOTAL HIP REPLACEMENT    Anesthesia Procedure: LEFT ANTERIOR TOTAL HIP REPLACEMENT (Left)    Surgeons and Role:     * Duy Ellison MD - Primary    Pre-op vitals reviewed.  Temp: 98.2 °F (36.8 °C)  Pulse: 69  Resp: 18  BP: 128/82  SpO2: 100 %  Body mass index is 22.71 kg/m².    Current medications reviewed.  Hospital Medications:   [Transfer Hold] acetaminophen (Tylenol Extra Strength) tab 1,000 mg  1,000 mg Oral Once    lactated ringers infusion   Intravenous Continuous    [Transfer Hold] tranexamic acid in sodium chloride 0.7% (Cyklokapron) 1000 mg/100mL infusion premix 1,000 mg  1,000 mg Intravenous Once    ceFAZolin (Ancef) 2g in 10mL IV syringe premix  2 g Intravenous Once    clonidine/epinephrine/ropivacaine/ketorolac in 0.9% NaCl 60 mL pain cocktail syringe for hip arthroplasty   Infiltration Once (Intra-Op)    [] clonidine/epinephrine/ropivacaine/ketorolac in 0.9% NaCl 60 mL pain cocktail syringe for hip arthroplasty   Infiltration Once (Intra-Op)       Outpatient Medications:     Medications Prior to Admission   Medication Sig Dispense Refill Last Dose    aspirin 81 MG Oral Tab EC Take 1 tablet (81 mg total) by mouth 2 (two) times daily.   post op    celecoxib 200 MG Oral Cap Take 1 capsule (200 mg total) by mouth daily.   post op    docusate sodium 100 MG Oral Cap Take 100 mg by mouth 2 (two) times daily.   post op    oxyCODONE 5 MG Oral Tab Take 1 tablet (5 mg total) by mouth every 6 (six) hours as needed.   post op    traMADol 50 MG Oral Tab Take 1 tablet (50 mg total) by mouth every 6 (six) hours as needed.   post op    acetaminophen 500 MG Oral Tab Take 2 tablets (1,000 mg total) by mouth every 6 (six) hours.   2024 at 1030       Allergies: Patient has no known allergies.      Anesthesia Evaluation    Patient summary  reviewed.    Anesthetic Complications           GI/Hepatic/Renal                                 Cardiovascular                                                       Endo/Other                                  Pulmonary                           Neuro/Psych                                      History reviewed. No pertinent surgical history.  Social History     Socioeconomic History    Marital status: Single   Tobacco Use    Smoking status: Former    Smokeless tobacco: Never    Tobacco comments:     quit    Vaping Use    Vaping status: Never Used   Substance and Sexual Activity    Alcohol use: Not Currently     Comment: x1 year    Drug use: Never     History   Drug Use Unknown     Available pre-op labs reviewed.  Lab Results   Component Value Date    WBC 9.5 09/12/2024    RBC 3.65 (L) 09/12/2024    HGB 13.1 09/12/2024    HCT 36.7 (L) 09/12/2024    .5 (H) 09/12/2024    MCH 35.9 (H) 09/12/2024    MCHC 35.7 09/12/2024    RDW 11.9 09/12/2024    .0 09/12/2024     Lab Results   Component Value Date     (L) 09/12/2024    K 4.4 09/12/2024     09/12/2024    CO2 25.0 09/12/2024    BUN 15 09/12/2024    CREATSERUM 0.69 (L) 09/12/2024    GLU 97 09/12/2024    CA 9.5 09/12/2024            Airway      Mallampati: II  Mouth opening: >3 FB  TM distance: > 6 cm  Neck ROM: full Cardiovascular    Cardiovascular exam normal.         Dental             Pulmonary    Pulmonary exam normal.                 Other findings              ASA: 1   Plan: general and spinal  NPO status verified and           Plan/risks discussed with: patient                Present on Admission:  **None**

## 2024-09-27 NOTE — OPERATIVE REPORT
[facility]    TOTAL HIP REPLACEMENT OPERATIVE REPORT    DATE OF SURGERY 9/27/2024    Aneesh Tian       [id number]     5/26/1955    PRE-OP DX:  LEFT HIP PRIMARY OSTEOARTHRITIS  POST-OP DX:  SAME  PROCEDURE:  DIRECT ANTERIOR LEFT TOTAL HIP REPLACEMENT  SURGEON:  TERESA GUTIERREZ MD  FIRST ASSIST: NAIVAL BATISTA PA-C  ANESTHESIA: SPINAL  EBL:150 CC  COMPLICATIONS:  NONE  SPECIMEN:  FEMORAL HEAD  DRAIN: NONE   IMPLANT USED:   DEPUY C STEM 7 HO , PINNACLE MULTIHOLE ACETABULUM 58 mm,  NEUTRAL LINER, CERAMIC FEMORAL HEAD 36 BY 5,   PROCEDURE NOTE:  PATIENT WAS CORRECTLY IDENTIFIED AND OPERATIVE SITE WAS VERIFIED IN THE PREOPERATIVE HOLDING AREA.  PATIENT WAS BROUGHT TO THE OR AND WAS PLACED ON THE OPERATING TABLE.  ANESTHESIA WAS ADMINISTERED.  ARMS WERE POSITIONED BY ANESTHESIA.  PREOPERATIVE ANTIBIOTIC  AND TXA WERE GIVEN.  BOTH FEET/ANKLES WERE PADDED AND PLACED IN A BOOT.  SCDS WERE PLACED ON BOTH LEGS.  PATIENT WAS POSITIONED ON THE TABLE WITH POST.   PELVIS WAS POSITIONED AND VERIFIED BY C ARM.  THE OPERATIVE HIP WAS PREPPED AND DRAPED IN A SURGICALLY STERILE AND STANDARD FASHION.  TIME OUT WAS PERFORMED.  HIP INCISION WAS MADE APPROXIMATELY 2 cm LATERAL AND 1 cm INFERIOR TO THE ASIS.  SHARP DISSECTION WAS MADE THROUGH THE SUBCUTANEOUS TISSUE.  FASCIAL LAYER WAS SHARPLY DIVIDED.  TENSOR FASCIA MUSCLE WAS IDENTIFIED AND RETRACTED LATERALLY.  SARTORIUS WAS RETRACTED MEDIALLY.  THE CIRCUMFLEX VESSELS WERE TREATED WITH BIPOLAR INSTRUMENT.  ANTERIOR HIP CAPSULE WAS IDENTIFIED.  RECTUS AND HIP FLEXOR WERE RETRACTED MEDIALLY.  SUPERIOR AND INFERIOR EXTRACAPSULAR COBRA RETRACTORS WERE PLACED.  ANTERIOR ACETABULAR RETRACTOR WAS PLACED IN A CAREFUL FASHION.   ANTERIOR CAPSULOTOMY WAS MADE.  FEMORAL HEAD AND NECK WERE IDENTIFIED.   INFERIOR CAPSULAR RELEASE WAS PERFORMED.  DEGENERATIVE CHANGES WERE NOTED.  FEMORAL NECK OSTEOTOMY WAS MADE.  FEMORAL HEAD WAS REMOVED WITH A CORK SCREW.  BONE WAS VERY SOFT.  POSTERIOR AND  INFERIOR ACETABULAR RETRACTORS WERE PLACED CAREFULLY.  GOOD ACETABULAR EXPOSURE WAS OBTAINED.   LABRAL TISSUE WAS EXCISED.  MEDIAL WALL WAS IDENTIFIED AND CLEARED OF SOFT TISSUES.  ACETABULAR REAMING WAS PERFORMED IN A SEQUENTIAL FASHION BY 1-2 mm.  REAMING WAS MEDIALIZED TO THE MEDIAL WALL.  FINAL REAMING WAS 1 mm UNDER SIZED IN RELATION TO THE REAL COMPONENT.   A TRIAL CUP WAS PLACED AND WAS FELT TO BE A STABLE FIT.  C ARM WAS USED TO EXAMINE THE CUP SIZE AND FIT.  ACETABULAR OSTEOPHYTES WERE REMOVED CAREFULLY.   58 mm ACETABULAR COMPONENT WAS IMPACTED WITH APPROPRIATE ABDUCTION AND ANTEVERSION ANGLE.  IT WAS FELT TO BE A STABLE FIT.  ANTERIOR LIP OF THE CUP WAS NOT PROUD.  C ARM WAS USED TO VERIFY THE POSITION OF THE CUP.  A TRIAL LINER WAS INSERTED.  LARGE BONE SPURRING ESPECIALLY ANTERIORLY WAS TAKEN DOWN CAREFULLY.  ATTENTION WAS PLACED TO EXPOSING FEMORAL SIDE.  FEMORAL ELEVATOR WAS PLACED.  FOOT WAS EXTERNALLY ROTATED.  SUPERIOR CAPSULAR RELEASE WAS PERFORMED.  HIP WAS THEN EXTENDED AND ADDUCTED.  CAREFUL ATTENTION WAS GIVEN TO ASSESS THE TIGHTNESS OF THE CAPSULE AND POSITION OF GREATER TROCHANTERIC BONE.  TROCHANTERIC RETRACTOR WAS PLACED.  POSTERIOR NECK RETRACTOR WAS PLACED. HIP WAS EXTENDED MORE AND SUPERIOR CAPSULE WAS RELEASED MORE.   HIP WAS VERY TIGHT.  I BELIEVE I RELEASE MOST OF EXTERNAL ROTATERS TO BE ABLE TO BRING GREATER TROCH BONE UP.  GOOD FEMORAL EXPOSURE WAS OBTAINED.  RONGEUR WAS USED TO LATERALIZE THE STARTING HOLE A BIT MORE.   CHILLY PEPPER WAS PLACED IN PROXIMAL FEMUR BY HAND.  LEG WAS BROUGHT BACK TO NEUTRAL POSITION AND C ARM WAS USED TO ASSESS THE POSITION OF THE CHILLY PEPPER.  PROXIMAL FEMUR WAS RE-EXPOSED.  SEQUENTIAL BROACHING WAS CAREFULLY CARRIED OUT UNTIL GOOD FIT WAS OBTAINED.   TRIAL FEMORAL NECK AND HEAD WERE PLACED.  HIP WAS VISUALIZED ON THE C ARM.  LEG LENGTH AND OFFSET WERE ASSESSED.  NO FRACTURE WAS IDENTIFIED.  HIP WAS STABLE TO ANTERIOR STRESS.  ALL THE TRIAL  IMPLANTS WERE REMOVED.  WOUND WAS IRRIGATED COPIOUSLY.  HEMOSTASIS WAS OBTAINED.  ACETABULUM WAS REEXPOSED.  REAL LINER WAS IMPACTED.  ITS SEATING WAS VERIFIED.  LOCAL COCKTAIL WAS INFILTRATED CAREFULLY TO CAPSULE AND SURROUNDING SOFT TISSUE.  PROXIMAL FEMUR WAS EXPOSED.  MORE LOCAL COCKTAIL WAS INFILTRATED TO SURROUNDING SOFT TISSUE.  CEMENT RESTRICTOR WAS PLACED.  CANAL WAS IRRIGATED AND PACKED WITH THROAT PACK.  HV CEMENT WAS MIXED AND INTRODUCED IN RETROGRADE FASHION.  CEMENT WAS PRESSURIZED.  REAL FEMORAL STEM WAS INSERTED IN NEUTRAL POSITION.    FEMORAL HEAD WAS IMPACTED.  NO FEMORAL FRACTURE WAS IDENTIFIED.  HIP WAS REDUCED.  C ARM WAS USED TO CHECK AP AND LATERAL VIEWS.  IMPLANT POSITION AND FIT APPEARED TO BE SATISFACTORY.  NO FRACTURE WAS IDENTIFIED ON C ARM.  WOUND WAS IRRIGATED THROUGH OUT THE CASE.  FASCIAL LAYER WAS CLOSED.  SUBCUTANEOUS LAYER WAS CLOSED IN MUTLIPLE LAYERS.  SKIN WAS CLOSED.  DRESSING WAS APPLIED.  ALL COUNTS WERE CORRECT.  FIRST ASSISTANT WAS NECESSARY FOR PATIENT POSITIONING, RETRACTION OF SOFT TISSUES FOR ACETABULAR AND FEMORAL EXPOSURE, IMPLANT INSERTION, DISLOCATION AND REDUCTION OF THE HIP JOINT, STABILITY TESTING, AND WOUND CLOSURE.    Duy Ellison MD  9/27/2024

## 2024-09-27 NOTE — ANESTHESIA PROCEDURE NOTES
Spinal Block    Performed by: Percy Garcia CRNA  Authorized by: Doc Cardoza MD      General Information and Staff    Start Time:   Anesthesiologist:  Doc Cardoza MD  CRNA:  Percy Garcia CRNA  Performed by:  CRNA  Site identification: surface landmarks    Reason for Block: surgical anesthesia    Preanesthetic Checklist: patient identified, IV checked, risks and benefits discussed, monitors and equipment checked, pre-op evaluation, timeout performed, anesthesia consent and sterile technique used      Procedure Details    Patient Position:  Sitting  Prep: ChloraPrep    Monitoring:  Cardiac monitor, heart rate and continuous pulse ox  Approach:  Midline  Location:  L3-4  Injection Technique:  Single-shot    Needle    Needle Type:  Sprotte  Needle Gauge:  24 G  Needle Length:  3.5 in    Assessment    Sensory Level:  T10  Events: clear CSF, CSF aspirated, well tolerated and blood negative      Additional Comments

## 2024-09-28 VITALS
DIASTOLIC BLOOD PRESSURE: 56 MMHG | WEIGHT: 158.31 LBS | HEIGHT: 70 IN | HEART RATE: 71 BPM | RESPIRATION RATE: 17 BRPM | OXYGEN SATURATION: 97 % | SYSTOLIC BLOOD PRESSURE: 127 MMHG | BODY MASS INDEX: 22.66 KG/M2 | TEMPERATURE: 98 F

## 2024-09-28 PROCEDURE — 97530 THERAPEUTIC ACTIVITIES: CPT

## 2024-09-28 PROCEDURE — 97161 PT EVAL LOW COMPLEX 20 MIN: CPT

## 2024-09-28 PROCEDURE — 97116 GAIT TRAINING THERAPY: CPT

## 2024-09-28 PROCEDURE — 97165 OT EVAL LOW COMPLEX 30 MIN: CPT

## 2024-09-28 PROCEDURE — 97535 SELF CARE MNGMENT TRAINING: CPT

## 2024-09-28 NOTE — PLAN OF CARE
Pt given pain meds as requesting see mar. Vss at this time. Voiding in urinal freely. Dressing in place, gel ice applied. Plan pt to see pt/ot adarsh, Pain control, Then home when ready. Pt verbalized understanding of Call dont fall protocol & poc. Call light wihtin reach. Bed alarm in place.

## 2024-09-28 NOTE — CONSULTS
[facility] Hospitalist Initial Consult       Reason for consult: Medical Management sp L MAXIMILIANO      History of Present Illness: Patient is a 69 year old male with PMH sig for OA who presents sp LTHA.   He tolerated the procedure well without any immediate complications.  Specifically, he denies N/V, lightheadness, chest pain, SOB, cough or fever.  His pain is currently controlled on current regimen.      No current facility-administered medications on file prior to encounter.     Current Outpatient Medications on File Prior to Encounter   Medication Sig Dispense Refill    aspirin 81 MG Oral Tab EC Take 1 tablet (81 mg total) by mouth 2 (two) times daily.      celecoxib 200 MG Oral Cap Take 1 capsule (200 mg total) by mouth daily.      docusate sodium 100 MG Oral Cap Take 100 mg by mouth 2 (two) times daily.      oxyCODONE 5 MG Oral Tab Take 1 tablet (5 mg total) by mouth every 6 (six) hours as needed.      traMADol 50 MG Oral Tab Take 1 tablet (50 mg total) by mouth every 6 (six) hours as needed.      acetaminophen 500 MG Oral Tab Take 2 tablets (1,000 mg total) by mouth every 6 (six) hours.             ALL:  No Known Allergies   Past Medical History:    Osteoarthritis    both hips, right knee    Visual impairment    reading glasses      History reviewed. No pertinent surgical history.   Social History     Tobacco Use    Smoking status: Former    Smokeless tobacco: Never    Tobacco comments:     quit    Substance Use Topics    Alcohol use: Not Currently     Comment: x1 year        Fam Hx  History reviewed. No pertinent family history.    Review of Systems  All 10 systems otherwise reviewed and negative unless otherwise stated in the HPI.        OBJECTIVE:  /56 (BP Location: Left arm)   Pulse 72   Temp 97.6 °F (36.4 °C) (Oral)   Resp 17   Ht 5' 10\" (1.778 m)   Wt 158 lb 4.6 oz (71.8 kg)   SpO2 97%   BMI 22.71 kg/m²   Gen: No acute distress, alert and oriented x3, no focal neurologic  deficits  HEENT:  EOMI, PERRLA, OP clear, MMM  Pulm: Lungs clear bilaterally, normal respiratory effort  CV: Heart with regular rate and rhythm, no murmur.  Normal PMI.    Abd: Abdomen soft, nontender, nondistended, no organomegaly, bowel sounds present  MSK: Full range of motion in extremities, no clubbing, no cyanosis.  L hip incision clean.   Skin: no rashes or lesions  Neuro:  Grossly intact, no sensory deficits     Laboratory:  No results for input(s): \"WBC\", \"HGB\", \"MCV\", \"PLT\", \"BAND\", \"INR\" in the last 168 hours.    Invalid input(s): \"LYM#\", \"MONO#\", \"BASOS#\", \"EOSIN#\"    No results for input(s): \"NA\", \"K\", \"CL\", \"CO2\", \"BUN\", \"CREATSERUM\", \"CA\", \"CAION\", \"MG\", \"PHOS\", \"GLU\" in the last 168 hours.    No results for input(s): \"ALT\", \"AST\", \"ALB\", \"AMYLASE\", \"LIPASE\", \"LDH\" in the last 168 hours.    Invalid input(s): \"ALPHOS\", \"TBIL\", \"DBIL\", \"TPROT\"    No results for input(s): \"PGLU\" in the last 168 hours.    No results for input(s): \"TROP\" in the last 168 hours.    ASSESSMENT / PLAN:    69 yr old male with PMH sig for OA who presents sp L MAXIMILIANO.      OA sp L TKA  - Plan per ortho.  Continue PT/OT.  Pain is currently controlled.  ASA for DVT prophylaxis.   - encourage IS use      Prevention:  ASA, SCDs, bowel regimen      Outpatient records and previous hospital records reviewed.     Thank you for allowing me to participate in the care of this patient.     Cesario Reynoso DO  [facility] Hospitalist  Pager [phone]

## 2024-09-28 NOTE — PLAN OF CARE
NURSING ADMISSION NOTE      Patient admitted via bed  Oriented to room.  Safety precautions initiated.  Bed in low position.  Call light in reach.  Denies need for pain med.  See admit assessment.

## 2024-09-28 NOTE — PROGRESS NOTES
[facility]   part of [facility]        Aneesh Tian Patient Status:  Outpatient in a Bed    1955 MRN [id number]   Location [facility] 3SW-A Attending Duy Ellison MD   Hosp Day # 0 PCP Mary Finley MD       Subjective:    POD #1 s/p left anterior total hip arthoplasty  No major complaints.  No calf pain, CP, SOB, lightheadedness, nausea.  Concerned about PT. Has been ambulating with crutches for years due to severe bilat hip OA    Physical Exam:  Temp:  [97.5 °F (36.4 °C)-98.2 °F (36.8 °C)] 97.5 °F (36.4 °C)  Pulse:  [64-85] 64  Resp:  [12-21] 16  BP: (114-136)/(54-92) 114/70  SpO2:  [94 %-100 %] 97 %    In no acute distress  Hip Aquacel dressing is clean and dry.  No signs of infection  Thigh and leg are soft.  Both calves are NT.  No signs of DVT.  NVI + PF/DF +PP    Data review:  Post op x-ray reviewed.      S/p Left anterior THR      Today's plan discussed..  PT/OT posterior hip precautions  Prefer ambulation with walker, but okay for patient to use crutches if he feels safer  DVT prophylaxis with  aspirin  Anticipate DC to home today.  Follow up in 2 weeks..      Xi Dodge PA-C

## 2024-09-28 NOTE — CM/SW NOTE
Pt was set up pre-operatively with [facility]. Reserved [facility] in [place]. AVS sent when available.     [facility]  Phone # [phone]  Fax # [phone]    USAMA Hitchcock, LSW  Discharge Planner       left upper arm

## 2024-09-28 NOTE — OCCUPATIONAL THERAPY NOTE
OCCUPATIONAL THERAPY ORTHO EVALUATION - INPATIENT     Room Number: 369/369-A  Evaluation Date: 9/28/2024  Type of Evaluation: Initial  Presenting Problem: L MAXIMILIANO    Physician Order: IP Consult to Occupational Therapy  Reason for Therapy: ADL/IADL Dysfunction and Discharge Planning    OCCUPATIONAL THERAPY ASSESSMENT   Patient is currently functioning near baseline with toileting, bathing, lower body dressing, grooming, transfers, static standing balance, and dynamic standing balance. Prior to admission, patient's baseline is modified independent using crutches for 15 years.  Patient is requiring supervision as a result of the following impairments: decreased functional reach, pain, impaired standing balance, difficulty maintaining precautions, decreased safety awareness, and impulsivity . Occupational Therapy will continue to follow for duration of hospitalization.    Patient will benefit from continued skilled OT Services at discharge to promote prior level of function.  Anticipate patient will return home with home health OT      History Related to Current Admission: Patient is a 69 year old male admitted on 9/27/2024 with Presenting Problem: L MAXIMILIANO. Co-Morbidities : bilateral hip OA    Recommendations for nursing staff:   Transfers: SBA, RW  Toileting location: bathroom, commode frame over toilet    EVALUATION SESSION    Patient Start of Session: supine    FUNCTIONAL TRANSFER ASSESSMENT  Sit to Stand: Edge of Bed  Edge of Bed: Stand-by Assist  Toilet Transfer: Stand-by Assist    BED MOBILITY  Supine to Sit : Supervision  Scooting: supervision    BALANCE ASSESSMENT     FUNCTIONAL ADL ASSESSMENT  UB Dressing Seated: Independent  LB Dressing Seated: Stand-by Assist  LB Dressing Standing: Stand-by Assist      ACTIVITY TOLERANCE: WFL           BP: 127/56  BP Location: Left arm  BP Method: Automatic  Patient Position: Lying    O2 SATURATIONS  Oxygen Therapy  SPO2% on Room Air at Rest: 100    Cognition  A, O x4  Able to  make needs known  Impaired attention /impulsive at times    Upper extremity  WFL    EDUCATION PROVIDED  Patient: Role of Occupational Therapy; Plan of Care; Discharge Recommendations; Functional Transfer Techniques; Fall Prevention; Surgical Precautions; Posture/Positioning; Compensatory ADL Techniques; Proper Body Mechanics; Weight Bear Status; DME Recommendations  Patient's Response to Education: Verbalized Understanding; Returned Demonstration      Equipment used: rw, gait belt  Demonstrates functional use, Would benefit from additional trial yes    Therapist comments:   Increased time spent throughout session reinforcing and re-directing patient to posterior hip precautions, RW integration, and safety. OT educated patient on ADL sequencing, energy conservation, safety, and integration of posterior precautions. Emphasized importance of sitting on high enough surfaces to maintain no flexion >90 degrees for ADLs, educated patient on safe RW integration for toilet transfers and recommended him to obtain 3 in 1 commode and tub transfer bench.     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 Lives in a house with supportive spouse.     SUBJECTIVE   \"Am I being a pill?\"    PAIN ASSESSMENT  Rating: 3  Location: L hip  Management Techniques: Ice;Repositioning;Body  mechanics;Relaxation    OBJECTIVE  Precautions: Bed/chair alarm;MAXIMILIANO - posterior  Fall Risk: Standard fall risk    WEIGHT BEARING RESTRICTION  Weight Bearing Restriction: L lower extremity           L Lower Extremity: Weight Bearing as Tolerated    ASSESSMENTS    AM-PAC '6-Clicks' Inpatient Daily Activity Short Form  -   Putting on and taking off regular lower body clothing?: A Little  -   Bathing (including washing, rinsing, drying)?: A Little  -   Toileting, which includes using toilet, bedpan or urinal? : A Little  -   Putting on and taking off regular upper body clothing?: A Little  -   Taking care of personal grooming such as brushing teeth?: None  -   Eating meals?: None    AM-PAC Score:  Score: 20  Approx Degree of Impairment: 38.32%  Standardized Score (AM-PAC Scale): 42.03    PLAN  OT Treatment Plan: Functional transfer training;ADL training;Energy conservation/work simplification techniques;Patient/Family training;Equipment eval/education;Patient/Family education;Compensatory technique education  Rehab Potential : Good  Frequency: Daily  Number of Visits to Meet Established Goals: 1    ADL Goals  Patient will perform toileting with supervision and AE PRN  Patient will perform LB dressing with supervision and AE PRN    Functional Transfer Goals  Patient will perform bed mobility supine to sit with supervision  Patient will perform bed mobility sit to supine with supervision  Patient will perform toilet transfer with supervision    Additional Goals:  Patient will state precautions and maintain during ADL      Patient Evaluation Complexity Level:   Occupational Profile/Medical History LOW - Brief history including review of medical or therapy records    Specific performance deficits impacting engagement in ADL/IADL MODERATE  3 - 5 performance deficits   Client Assessment/Performance Deficits MODERATE - Comorbidities and min to mod modifications of tasks    Clinical Decision Making LOW - Analysis of  occupational profile, problem-focused assessments, limited treatment options    Overall Complexity LOW     OT Session Time: 45 minutes  Self-Care Home Management: 30 minutes  Therapeutic Activity: 10 minutes

## 2024-09-28 NOTE — CM/SW NOTE
Spoke with PT. PT states HHC at discharge. Spoke with pt over the phone. Agreeable to HH. Pt is in good spirits today to be discharging. SW sent HH referral to [facility]. Spouse will transport home. Await HH acceptance.     Alycia Carter MSW, LSW  Discharge Planner

## 2024-09-28 NOTE — PROGRESS NOTES
Patient discharged home with [facility] via family transport.  Educational video watched, patient verbalizes understanding.  Discharge education taught, patient verbalizes understanding.  Belongings sent with patient.  Walker sent with patient.

## 2024-09-28 NOTE — PHYSICAL THERAPY NOTE
PHYSICAL THERAPY EVALUATION - INPATIENT     Room Number: 369/369-A  Evaluation Date: 9/28/2024  Type of Evaluation: Initial  Physician Order: PT Eval and Treat    Presenting Problem: L MAXIMILIANO  Co-Morbidities : B hip OA  Reason for Therapy: Mobility Dysfunction and Discharge Planning    PHYSICAL THERAPY ASSESSMENT   Patient is a 69 year old male admitted 9/27/2024 for L MAXIMILIANO.   Patient is currently functioning near baseline with bed mobility, transfers, gait, stair negotiation, maintaining seated position, and standing prolonged periods. Prior to admission, patient's baseline is Mod I with use of B crutches for ambulation, independent with ADL.     Patient will benefit from continued skilled PT Services at discharge to promote prior level of function.  Anticipate patient will return home with home health PT.    PLAN  Patient has been evaluated and presents with no skilled Physical Therapy needs at this time.  Patient discharged from Physical Therapy services.  Please re-order if a new functional limitation presents during this admission.    GOALS  Patient was able to achieve the following goals ...    Patient was able to transfer Safely and Mod I   Patient able to ambulate on level surfaces Safely and Mod I         HOME SITUATION  Type of Home: House   Home Layout: One level  Stairs to Enter : 4  Railing: Yes          Lives With: Spouse  Drives: Yes  Patient Owned Equipment: Crutches  Patient Regularly Uses: Glasses    Prior Level of [place]: Mod I with ambulation with use of crutches, independent with ADL, works full time.    SUBJECTIVE  Pt pleasant and cooperative.      OBJECTIVE  Precautions: MAXIMILIANO - posterior;Bed/chair alarm  Fall Risk: Standard fall risk    WEIGHT BEARING RESTRICTION  Weight Bearing Restriction: L lower extremity           L Lower Extremity: Weight Bearing as Tolerated    PAIN ASSESSMENT  Rating: Unable to rate  Location: mild discomfort L hip  Management Techniques: Activity promotion;Body  mechanics    COGNITION  Overall Cognitive Status:  WFL - within functional limits    RANGE OF MOTION AND STRENGTH ASSESSMENT  Upper extremity ROM and strength are within functional limits     Lower extremity ROM is within functional limits  L hip limited in ROM due to surgery    Lower extremity strength is within functional limits  R LE 5/5, Pt able to perform seated L knee extension      BALANCE                ADDITIONAL TESTS                                    ACTIVITY TOLERANCE  Pulse: 71  Heart Rate Source: Monitor                   O2 WALK  Oxygen Therapy  SPO2% on Room Air at Rest: 100    NEUROLOGICAL FINDINGS                        AM-PAC '6-Clicks' INPATIENT SHORT FORM - BASIC MOBILITY  How much difficulty does the patient currently have...  Patient Difficulty: Turning over in bed (including adjusting bedclothes, sheets and blankets)?: None   Patient Difficulty: Sitting down on and standing up from a chair with arms (e.g., wheelchair, bedside commode, etc.): None   Patient Difficulty: Moving from lying on back to sitting on the side of the bed?: None   How much help from another person does the patient currently need...   Help from Another: Moving to and from a bed to a chair (including a wheelchair)?: None   Help from Another: Need to walk in hospital room?: None   Help from Another: Climbing 3-5 steps with a railing?: None       AM-PAC Score:  Raw Score: 24   Approx Degree of Impairment: 0%   Standardized Score (AM-PAC Scale): 61.14   CMS Modifier (G-Code): CH    FUNCTIONAL ABILITY STATUS  Gait Assessment        Skilled Therapy Provided     Bed Mobility:  Rolling: NT  Supine to sit: Mod I   Sit to supine: Mod I     Transfer Mobility:  Sit to stand: Mod I   Stand to sit: Mod I  Gait = Mod I with B crutches    Therapist's comments:PT orders received, chart reviewed, and RN approved PT session. Pt lying in bed and agreeable to PT. Vitals assessed and WNL. PT issued HEP with posterior hip precautions, and inc  time spent on education, instruction of the posterior hip precautions and how they are integrated with functional activities. Instructed pt in proper body mechanics with bed mobility. Pt required continuous cueing throughout to maintain precautions. Pt instructed in proper hand placement and integration of RW with transfers and ambulation. Pt cued for upright posture in standing and during ambulation and to keep RW within MAYUR. Pt instructed in proper gait sequencing. Discussed with pt the importance of using the RW vs. Crutches for ambulation due to pt's hip precautions and safety. Pt instructed in stair training, and insisted using crutches on the stairs. Pt required inc cueing for technique, safety, and to maintain precautions. Pt then instructed in car/tub transfer and required cueing throughout. Pt slightly impulsive, and with difficulty adhering to precautions. Wrote stair sequencing on HEP. Pt returned to room, and inc time spent on stressing the importance of adhering to precautions, to be slow and take caution with all mobility at home. Pt in understanding. Made adjustments to pt's personal RW. Increased time spent on education and discussion with pt regarding the importance of safety awareness, fall precautions, activity level and pacing. Also discussed positioning, symptom management techniques at home. Pt left up in chair, with all needs in reach. RN notified.     Exercise/Education Provided:  Bed mobility  Body mechanics  Crutch training  Energy conservation  Functional activity tolerated  Gait training  Posture  Transfer training    Patient End of Session: Up in chair;Call light within reach;RN aware of session/findings;All patient questions and concerns addressed;Alarm set    Patient Evaluation Complexity Level:  History Moderate - 1 or 2 personal factors and/or co-morbidities   Examination of body systems Low -  addressing 1-2 elements   Clinical Presentation Low- Stable   Clinical Decision Making Low  Complexity       PT Session Time: 45 minutes  Gait Training: 15 minutes  Therapeutic Activity: 15 minutes  Neuromuscular Re-education: 0 minutes  Therapeutic Exercise: 0 minutes

## 2024-09-28 NOTE — PLAN OF CARE
A&Ox4. VSS. On room air. . IS encouraged. SCDs on BLE. Ankle pumps encouraged. Tolerating regular diet. Last BM 9/27. Voiding freely. Pain controlled. Dressing to left hip, C/D/I. Ambulating with minimal assist. Plan is to work with PT/OT. Patient updated on plan of care. Safety precautions in place. Call light within reach.

## (undated) DEVICE — ANTIBACTERIAL UNDYED BRAIDED (POLYGLACTIN 910), SYNTHETIC ABSORBABLE SUTURE: Brand: COATED VICRYL

## (undated) DEVICE — GOWN SUR 2XL LEV 4 BLU W/ WHT V NK BND AERO

## (undated) DEVICE — CURETTE SUR FOR BNE CEM REM QUIK USE

## (undated) DEVICE — RECIPROCATING BLADE HEAVY DUTY LONG, OFFSET  (77.6 X 0.77 X 11.2MM)

## (undated) DEVICE — SUT MCRYL 3-0 27IN ABSRB UD 19MM PS-2 3/8

## (undated) DEVICE — NEEDLE SPNL 20GA L3.5IN YEL QNCKE STYL DISP

## (undated) DEVICE — SMARTSET HV HIGH VISCOSITY BONE CEMENT 40G
Type: IMPLANTABLE DEVICE | Site: HIP | Status: NON-FUNCTIONAL
Brand: SMARTSET

## (undated) DEVICE — ADHESIVE SKIN TOP FOR WND CLSR DERMBND ADV

## (undated) DEVICE — 3M™ IOBAN™ 2 ANTIMICROBIAL INCISE DRAPE 6650EZ: Brand: IOBAN™ 2

## (undated) DEVICE — SUT VCRL 1 27IN CPX ABSRB UD L48MM 1/2 CIR

## (undated) DEVICE — GAUZE PK 2INX3YD COT FAN FLD RADPQ INNR

## (undated) DEVICE — WRAP HIP COMPR

## (undated) DEVICE — SLEEVE COMPR MD KNEE LEN SGL USE KENDALL SCD

## (undated) DEVICE — SUT STRATAFIX MCRYL + 3-0 30X30CM ABSRB UD PS-2

## (undated) DEVICE — SYRINGE MED 20ML STD CLR PLAS LL TIP N CTRL

## (undated) DEVICE — Device

## (undated) DEVICE — ANTERIOR HIP: Brand: MEDLINE INDUSTRIES, INC.

## (undated) DEVICE — NEPTUNE E-SEP SMOKE EVACUATION PENCIL, COATED, 70MM BLADE, PUSH BUTTON SWITCH: Brand: NEPTUNE E-SEP

## (undated) DEVICE — BONE PREPARATION KIT: Brand: BIOPREP

## (undated) DEVICE — 450 ML BOTTLE OF 0.05% CHLORHEXIDINE GLUCONATE IN 99.95% STERILE WATER FOR IRRIGATION, USP AND APPLICATOR.: Brand: IRRISEPT ANTIMICROBIAL WOUND LAVAGE

## (undated) DEVICE — HOOD, PEEL-AWAY: Brand: FLYTE

## (undated) DEVICE — BIPOLAR SEALER 23-112-1 AQM 6.0: Brand: AQUAMANTYS™

## (undated) DEVICE — PTFE COATED BLADE 4': Brand: MEDLINE

## (undated) DEVICE — GLOVE SUR 7.5 SENSICARE PI PIP CRM PWD F

## (undated) DEVICE — SOLUTION IRRIG 3000ML 0.9% NACL FLX CONT

## (undated) DEVICE — GLOVE SUR 7 SENSICARE PI PIP CRM PWD F

## (undated) DEVICE — COVER,LIGHT,CAMERA,HARD,1/PK,STRL: Brand: MEDLINE

## (undated) DEVICE — 1016 S-DRAPE IRRIG POUCH 10/BOX: Brand: STERI-DRAPE™

## (undated) DEVICE — GLOVE SUR 7 SENSICARE PI PIP GRN PWD F

## (undated) DEVICE — SUT ETHBND XL 5 30IN CCS NABSRB GRN 48MM 1/2